# Patient Record
Sex: FEMALE | Race: WHITE | NOT HISPANIC OR LATINO | Employment: FULL TIME | URBAN - METROPOLITAN AREA
[De-identification: names, ages, dates, MRNs, and addresses within clinical notes are randomized per-mention and may not be internally consistent; named-entity substitution may affect disease eponyms.]

---

## 2018-05-22 PROBLEM — Z34.80 SUPERVISION OF OTHER NORMAL PREGNANCY, ANTEPARTUM: Status: ACTIVE | Noted: 2018-02-26

## 2018-08-17 PROBLEM — Z30.09 STERILIZATION CONSULT: Status: ACTIVE | Noted: 2018-08-17

## 2024-04-14 ENCOUNTER — OFFICE VISIT (OUTPATIENT)
Dept: URGENT CARE | Facility: PHYSICIAN GROUP | Age: 34
End: 2024-04-14
Payer: COMMERCIAL

## 2024-04-14 VITALS
DIASTOLIC BLOOD PRESSURE: 62 MMHG | HEIGHT: 67 IN | WEIGHT: 155 LBS | HEART RATE: 70 BPM | OXYGEN SATURATION: 94 % | BODY MASS INDEX: 24.33 KG/M2 | RESPIRATION RATE: 20 BRPM | SYSTOLIC BLOOD PRESSURE: 118 MMHG | TEMPERATURE: 96.9 F

## 2024-04-14 DIAGNOSIS — N30.01 ACUTE CYSTITIS WITH HEMATURIA: ICD-10-CM

## 2024-04-14 LAB
APPEARANCE UR: NORMAL
BILIRUB UR STRIP-MCNC: NEGATIVE MG/DL
COLOR UR AUTO: NORMAL
GLUCOSE UR STRIP.AUTO-MCNC: NEGATIVE MG/DL
KETONES UR STRIP.AUTO-MCNC: NEGATIVE MG/DL
LEUKOCYTE ESTERASE UR QL STRIP.AUTO: NORMAL
NITRITE UR QL STRIP.AUTO: NEGATIVE
PH UR STRIP.AUTO: 7 [PH] (ref 5–8)
POCT INT CON NEG: NEGATIVE
POCT INT CON POS: POSITIVE
POCT URINE PREGNANCY TEST: NEGATIVE
PROT UR QL STRIP: 100 MG/DL
RBC UR QL AUTO: NORMAL
SP GR UR STRIP.AUTO: 1.02
UROBILINOGEN UR STRIP-MCNC: 0.2 MG/DL

## 2024-04-14 PROCEDURE — 81002 URINALYSIS NONAUTO W/O SCOPE: CPT | Performed by: PHYSICIAN ASSISTANT

## 2024-04-14 PROCEDURE — 3078F DIAST BP <80 MM HG: CPT | Performed by: PHYSICIAN ASSISTANT

## 2024-04-14 PROCEDURE — 3074F SYST BP LT 130 MM HG: CPT | Performed by: PHYSICIAN ASSISTANT

## 2024-04-14 PROCEDURE — 81025 URINE PREGNANCY TEST: CPT | Performed by: PHYSICIAN ASSISTANT

## 2024-04-14 PROCEDURE — 99203 OFFICE O/P NEW LOW 30 MIN: CPT | Performed by: PHYSICIAN ASSISTANT

## 2024-04-14 RX ORDER — NITROFURANTOIN 25; 75 MG/1; MG/1
100 CAPSULE ORAL EVERY 12 HOURS
Qty: 10 CAPSULE | Refills: 0 | Status: SHIPPED | OUTPATIENT
Start: 2024-04-14 | End: 2024-04-19

## 2024-04-14 RX ORDER — PHENAZOPYRIDINE HYDROCHLORIDE 200 MG/1
200 TABLET, FILM COATED ORAL 3 TIMES DAILY
Qty: 6 TABLET | Refills: 0 | Status: SHIPPED | OUTPATIENT
Start: 2024-04-14 | End: 2024-04-16

## 2024-04-14 ASSESSMENT — ENCOUNTER SYMPTOMS
FEVER: 0
NAUSEA: 0
CHILLS: 0
VOMITING: 0
ABDOMINAL PAIN: 1

## 2024-04-14 NOTE — PROGRESS NOTES
Subjective     Joanna Corrales is a 34 y.o. female who presents with UTI (Frequent urination,burning,blood in urine,x1 day)      UTI  This is a new problem. The current episode started yesterday. The problem occurs constantly. The problem has been gradually worsening. Associated symptoms include abdominal pain and urinary symptoms. Pertinent negatives include no chills, fever, nausea or vomiting. Swollen glands: Painful urination, urgency/frequency, blood in the urine.She has tried NSAIDs for the symptoms. The treatment provided mild relief.       Review of Systems   Constitutional:  Negative for chills and fever.   Gastrointestinal:  Positive for abdominal pain. Negative for nausea and vomiting.           PMH:  has a past medical history of History of anemia and Ulcer.  MEDS:   Current Outpatient Medications:     nitrofurantoin (MACROBID) 100 MG Cap, Take 1 Capsule by mouth every 12 hours for 5 days., Disp: 10 Capsule, Rfl: 0    phenazopyridine (PYRIDIUM) 200 MG Tab, Take 1 Tablet by mouth 3 times a day for 2 days., Disp: 6 Tablet, Rfl: 0    traZODone (DESYREL) 50 MG Tab, Take 1 Tab by mouth at bedtime as needed for Sleep. (Patient not taking: Reported on 7/30/2020), Disp: 30 Tab, Rfl: 3  ALLERGIES: No Known Allergies  SURGHX:   Past Surgical History:   Procedure Laterality Date    PB KNEE SCOPE,DIAGNOSTIC Right 8/6/2020    Procedure: ARTHROSCOPY, KNEE;  Surgeon: Isabela Carcamo M.D.;  Location: [facility];  Service: Orthopedics    PB LATERAL RETINACULAR RELEASE OPEN Right 8/6/2020    Procedure: RELEASE, KNEE, LATERAL RETINACULA;  Surgeon: Isabela Carcamo M.D.;  Location: [facility];  Service: Orthopedics    PB OSTEOTOMY TIBIA Right 8/6/2020    Procedure: OSTEOTOMY, TIBIA - OPEN TUBEROSITY;  Surgeon: Isabela Carcamo M.D.;  Location: [facility];  Service: Orthopedics    CHONDROPLASTY Right 8/6/2020    Procedure: CHONDROPLASTY - PATELLA;  Surgeon:  "Isabela Carcamo M.D.;  Location: SURGERY [facility];  Service: Orthopedics    SUBMANDIBLE ABSCESS INCISION AND DRAINAGE Left 9/23/2018    Procedure: SUBMANDIBLE ABSCESS INCISION AND DRAINAGE;  Surgeon: Jayson Hoff D.D.S.;  Location: SURGERY [facility];  Service: Dental    DENTAL EXTRACTION(S) Left 9/23/2018    Procedure: DENTAL EXTRACTION(S);  Surgeon: Jayson Hoff D.D.S.;  Location: SURGERY [facility];  Service: Dental    PRIMARY C SECTION WITH TUBAL LIGATION Bilateral 8/24/2018    Procedure: PRIMARY C SECTION WITH TUBAL LIGATION;  Surgeon: Kari Rowland M.D.;  Location: LABOR AND DELIVERY;  Service: Labor and Delivery    CHOLECYSTECTOMY      in 04/2010    PRIMARY C SECTION       SOCHX:  reports that she has never smoked. She has never used smokeless tobacco. She reports that she does not drink alcohol and does not use drugs.  FH: Family history was reviewed, no pertinent findings to report      Objective     /62 (BP Location: Right arm, Patient Position: Sitting, BP Cuff Size: Adult)   Pulse 70   Temp 36.1 °C (96.9 °F) (Temporal)   Resp 20   Ht 1.702 m (5' 7\")   Wt 70.3 kg (155 lb) Comment: per patient  SpO2 94%   BMI 24.28 kg/m²      Physical Exam  Constitutional:       Appearance: Normal appearance. She is well-developed.   HENT:      Head: Normocephalic and atraumatic.      Right Ear: External ear normal.      Left Ear: External ear normal.   Eyes:      Conjunctiva/sclera: Conjunctivae normal.      Pupils: Pupils are equal, round, and reactive to light.   Cardiovascular:      Rate and Rhythm: Normal rate and regular rhythm.      Heart sounds: No murmur heard.  Pulmonary:      Effort: Pulmonary effort is normal.      Breath sounds: Normal breath sounds.   Abdominal:      Palpations: Abdomen is soft.      Tenderness: There is no abdominal tenderness. There is no right CVA tenderness or left CVA tenderness.   Skin:     General: Skin is warm and dry.      " Capillary Refill: Capillary refill takes less than 2 seconds.   Neurological:      Mental Status: She is alert and oriented to person, place, and time.   Psychiatric:         Behavior: Behavior normal.         Judgment: Judgment normal.         POCT Urinalysis  Lab Results   Component Value Date/Time    POCCOLOR dark yellow 04/14/2024 02:39 PM    POCAPPEAR cloudy 04/14/2024 02:39 PM    POCLEUKEST moderate 04/14/2024 02:39 PM    POCNITRITE negative 04/14/2024 02:39 PM    POCUROBILIGE 0.2 04/14/2024 02:39 PM    POCPROTEIN 100 04/14/2024 02:39 PM    POCURPH 7.0 04/14/2024 02:39 PM    POCBLOOD large 04/14/2024 02:39 PM    POCSPGRV 1.020 04/14/2024 02:39 PM    POCKETONES negative 04/14/2024 02:39 PM    POCBILIRUBIN negative 04/14/2024 02:39 PM    POCGLUCUA negative 04/14/2024 02:39 PM        POCT PREGNANCY - Negative    Assessment & Plan       1. Acute cystitis with hematuria  - POCT Urinalysis  - POCT PREGNANCY  - nitrofurantoin (MACROBID) 100 MG Cap; Take 1 Capsule by mouth every 12 hours for 5 days.  Dispense: 10 Capsule; Refill: 0  - phenazopyridine (PYRIDIUM) 200 MG Tab; Take 1 Tablet by mouth 3 times a day for 2 days.  Dispense: 6 Tablet; Refill: 0            Differential Diagnosis, natural history, and supportive care discussed. Return to the Urgent Care or follow up with your PCP if symptoms fail to resolve, or for any new or worsening symptoms. Emergency room precautions discussed. Patient and/or family appears understanding of information.

## 2024-08-19 ENCOUNTER — NON-PROVIDER VISIT (OUTPATIENT)
Dept: URGENT CARE | Facility: CLINIC | Age: 34
End: 2024-08-19

## 2024-08-19 ENCOUNTER — OCCUPATIONAL MEDICINE (OUTPATIENT)
Dept: URGENT CARE | Facility: CLINIC | Age: 34
End: 2024-08-19
Payer: COMMERCIAL

## 2024-08-19 VITALS
WEIGHT: 155 LBS | HEIGHT: 67 IN | SYSTOLIC BLOOD PRESSURE: 130 MMHG | OXYGEN SATURATION: 98 % | TEMPERATURE: 97.4 F | RESPIRATION RATE: 16 BRPM | BODY MASS INDEX: 24.33 KG/M2 | HEART RATE: 64 BPM | DIASTOLIC BLOOD PRESSURE: 74 MMHG

## 2024-08-19 DIAGNOSIS — Z02.1 PRE-EMPLOYMENT DRUG SCREENING: ICD-10-CM

## 2024-08-19 DIAGNOSIS — S51.851A CAT BITE OF RIGHT FOREARM, INITIAL ENCOUNTER: ICD-10-CM

## 2024-08-19 DIAGNOSIS — Z02.83 ENCOUNTER FOR DRUG SCREENING: Primary | ICD-10-CM
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
## 2024-08-19 LAB
AMP AMPHETAMINE: NORMAL
BREATH ALCOHOL COMMENT: NORMAL
COC COCAINE: NORMAL
INT CON NEG: NORMAL
INT CON POS: NORMAL
MET METHAMPHETAMINES: NORMAL
OPI OPIATES: NORMAL
PCP PHENCYCLIDINE: NORMAL
POC BREATHALIZER: 0 PERCENT (ref 0–0.01)
POC DRUG COMMENT 753798-OCCUPATIONAL HEALTH: NEGATIVE
THC: NORMAL

## 2024-08-19 PROCEDURE — 99213 OFFICE O/P EST LOW 20 MIN: CPT | Performed by: NURSE PRACTITIONER

## 2024-08-19 PROCEDURE — 80305 DRUG TEST PRSMV DIR OPT OBS: CPT | Performed by: NURSE PRACTITIONER

## 2024-08-19 PROCEDURE — 3078F DIAST BP <80 MM HG: CPT | Performed by: NURSE PRACTITIONER

## 2024-08-19 PROCEDURE — 82075 ASSAY OF BREATH ETHANOL: CPT | Performed by: NURSE PRACTITIONER

## 2024-08-19 PROCEDURE — 3075F SYST BP GE 130 - 139MM HG: CPT | Performed by: NURSE PRACTITIONER

## 2024-08-19 RX ORDER — IBUPROFEN 800 MG/1
800 TABLET, FILM COATED ORAL EVERY 8 HOURS PRN
Qty: 90 TABLET | Refills: 0 | Status: SHIPPED | OUTPATIENT
Start: 2024-08-19

## 2024-08-19 ASSESSMENT — ENCOUNTER SYMPTOMS
SENSORY CHANGE: 0
CHILLS: 0
FEVER: 0
DIZZINESS: 0
TINGLING: 0
FOCAL WEAKNESS: 0
MYALGIAS: 0

## 2024-08-19 NOTE — LETTER
"    EMPLOYEE’S CLAIM FOR COMPENSATION/ REPORT OF INITIAL TREATMENT  FORM C-4  PLEASE TYPE OR PRINT    EMPLOYEE’S CLAIM - PROVIDE ALL INFORMATION REQUESTED   First Name                    LURDES BELLE                  Last Name  PULSIFER Birthdate                    1990                Sex  Female Claim Number (Insurer’s Use Only)     Home Address  [address] Age  34 y.o. Height  1.702 m (5' 7\") Weight  70.3 kg (155 lb) Social Security Number     [facility] Zip  [address] Telephone  [phone] (home)    Mailing Address  [address] [facility] Zip  [address] Primary Language Spoken  English    INSURER     THIRD-PARTY   [employer]   Employee's Occupation (Job Title) When Injury or Occupational Disease Occurred      Employer's Name/Company Name  [employer]  Telephone  [phone]    Office Mail Address (Number and Street)  [address] [place] [address]     Date of Injury (if applicable) 8/19/2024               Hours Injury (if applicable)  8:25 AM Date Employer Notified  8/19/2024 Last Day of Work after Injury or Occupational Disease  8/19/2024 Supervisor to Whom Injury Reported  Kasia Morales   Address or Location of Accident (if applicable)  Work [1]   What were you doing at the time of accident? (if applicable)  Getting weight on a cat    How did this injury or occupational disease occur? (Be specific and answer in detail. Use additional sheet if necessary)  Vet asked for a current weight on a cat in the exam room.  I was removing cat from the den and he freaked out. He twisted and got my hand and arm with his teeth and claws.   If you believe that you have an occupational disease, when did you first have knowledge of the disability and its relationship to your employment?  N/a Witnesses to the Accident (if applicable)  Non at exact moment.   " Amisha came after hearing the cat.etc      Nature of Injury or Occupational Disease  Laceration  Part(s) of Body Injured or Affected  Wrist (R) and Hand (R) N/A N/A    I CERTIFY THAT THE ABOVE IS TRUE AND CORRECT TO T HE BEST OF MY KNOWLEDGE AND THAT I HAVE PROVIDED THIS INFORMATION IN ORDER TO OBTAIN THE BENEFITS OF [place], OR [place]).  I HEREBY AUTHORIZE ANY PHYSICIAN, CHIROPRACTOR, SURGEON, PRACTITIONER OR ANY OTHER PERSON, ANY HOSPITAL, INCLUDING [facility] OR [facility], ANY  MEDICAL SERVICE ORGANIZATION, ANY INSURANCE COMPANY, OR OTHER INSTITUTION OR ORGANIZATION TO RELEASE TO EACH OTHER, ANY MEDICAL OR OTHER INFORMATION, INCLUDING BENEFITS PAID OR PAYABLE, PERTINENT TO THIS INJURY OR DISEASE, EXCEPT INFORMATION RELATIVE TO DIAGNOSIS, TREATMENT AND/OR COUNSELING FOR AIDS, PSYCHOLOGICAL CONDITIONS, ALCOHOL OR CONTROLLED SUBSTANCES, FOR WHICH I MUST GIVE SPECIFIC AUTHORIZATION.  A PHOTOSTAT OF THIS AUTHORIZATION SHALL BE VALID AS THE ORIGINAL.     Date 8/19/24   [facility] Employee’s Original or  *Electronic Signature   THIS REPORT MUST BE COMPLETED AND MAILED WITHIN 3 WORKING DAYS OF TREATMENT   [facility]    Name of Facility  [facility]   Date 8/19/2024 Diagnosis and Description of Injury or Occupational Disease  (S51.851A,  W55.01XA) Cat bite of right forearm, initial encounter  The encounter diagnosis was Cat bite of right forearm, initial encounter. Is there evidence that the injured employee was under the influence of alcohol and/or another controlled substance at the time of accident?  []No  [] Yes (if yes, please explain)   Hour 9:58 AM  No   Treatment: Ceftriaxone IM in clinic. Augmentin and ibuprofen.    Have you advised the patient to remain off work five days or more?   [] Yes Indicate dates: From   To    [] No      If no, is the injured employee capable of: []  full duty [] modified duty                     If modified duty, specify any limitations / restrictions:                                                                                                                                                                                                                                                                                                                                                                                                                  X-Ray Findings:  N/A    From information given by the employee, together with medical evidence, can you directly connect this injury or occupational disease as job incurred?  []Yes   [] No Yes    Is additional medical care by a physician indicated? []Yes [] No  Yes    Do you know of any previous injury or disease contributing to this condition or occupational disease? []Yes [] No (Explain if yes)                          No   Date  8/19/2024 Print Health Care Provider’s Name  Nathalia Varela A.P.N. I certify that the employer’s copy of  this form was delivered to the employer on:   Address  [address] [address] INSURER'S USE ONLY                       [facility] Zip  [address] Provider’s Tax ID Number  [id number]   Telephone  Dept: [phone]    Health Care Provider’s Original or Electronic Signature  e-NATHALIA Sheridan A.P.EMMANUEL Degree (MD,DO, DC,PA-C,APRN)  APRN  Choose (if applicable)      ORIGINAL - TREATING HEALTHCARE PROVIDER PAGE 2 - INSURER/TPA PAGE 3 - EMPLOYER PAGE 4 - EMPLOYEE             Form C-4 (rev.08/23)

## 2024-08-19 NOTE — PROGRESS NOTES
Joanna Omerelle NEETU is a 34 y.o. female here for a non-provider visit for post accident drug screen    If abnormal was an in office provider notified today (if so, indicate provider)? No    Routed to PCP? No

## 2024-08-19 NOTE — PROGRESS NOTES
Subjective     Joanna DE ANDA is a 34 y.o. female who presents with Cat Bite (WC NEW, cat bite and scratches to R hand/ wrist. DOI 8/19/24)            HPIDOI: 8/19/24/ Patient is 34 year old female with cat bite and scratches to right hand. She was attacked as she was trying to weigh cat. Painful along thumb back into wrist. No distal paresthesia. UTD on tetanus and right hand dominant.     Patient has no known allergies.  Current Outpatient Medications on File Prior to Visit   Medication Sig Dispense Refill    traZODone (DESYREL) 50 MG Tab Take 1 Tab by mouth at bedtime as needed for Sleep. (Patient not taking: Reported on 7/30/2020) 30 Tab 3     No current facility-administered medications on file prior to visit.     Social History     Socioeconomic History    Marital status:      Spouse name: Not on file    Number of children: Not on file    Years of education: Not on file    Highest education level: Not on file   Occupational History    Not on file   Tobacco Use    Smoking status: Never    Smokeless tobacco: Never   Vaping Use    Vaping status: Never Used   Substance and Sexual Activity    Alcohol use: No    Drug use: No    Sexual activity: Yes     Partners: Male     Birth control/protection: Surgical     Comment: Plans BTL.   Other Topics Concern    Not on file   Social History Narrative    Not on file     Social Determinants of Health     Financial Resource Strain: Not on file   Food Insecurity: Not on file   Transportation Needs: Not on file   Physical Activity: Not on file   Stress: Not on file   Social Connections: Not on file   Intimate Partner Violence: Not on file   Housing Stability: Not on file     Breast Cancer-related family history is not on file.      Review of Systems   Constitutional:  Negative for chills, fever and malaise/fatigue.   Musculoskeletal:  Negative for joint pain and myalgias.   Skin:         Cat bite to right forearm. Scratches to wrist and hand.   Neurological:   "Negative for dizziness, tingling, sensory change and focal weakness.              Objective     /74   Pulse 64   Temp 36.3 °C (97.4 °F)   Resp 16   Ht 1.702 m (5' 7\")   Wt 70.3 kg (155 lb)   SpO2 98%   BMI 24.28 kg/m²      Physical Exam  Vitals and nursing note reviewed.   Constitutional:       Appearance: Normal appearance.   Cardiovascular:      Rate and Rhythm: Normal rate and regular rhythm.      Heart sounds: No murmur heard.  Pulmonary:      Effort: Pulmonary effort is normal.      Breath sounds: Normal breath sounds.   Musculoskeletal:         General: Normal range of motion.   Skin:     General: Skin is warm and dry.      Comments: Patient with puncture wounds x 2 to right wrist/forearm.  She does have scratches along her wrist and into her hand.  She is clean the wounds at work with chlorhexidine.   Neurological:      General: No focal deficit present.      Mental Status: She is alert and oriented to person, place, and time.                             Assessment & Plan        Assessment & Plan  Cat bite of right forearm, initial encounter    Orders:    cefTRIAXone (Rocephin) 1 g in lidocaine (Xylocaine) 1 % 4 mL for IM use    amoxicillin-clavulanate (AUGMENTIN) 875-125 MG Tab; Take 1 Tablet by mouth 2 times a day for 7 days.    ibuprofen (MOTRIN) 800 MG Tab; Take 1 Tablet by mouth every 8 hours as needed for Moderate Pain.    Rocephin in clinic.  Augmentin and ibuprofen.  Wound care discussed with patient. Wounds dressed in clinic.  Follow up Friday. Full duty              "

## 2024-08-19 NOTE — LETTER
PHYSICIAN’S AND CHIROPRACTIC PHYSICIAN'S   PROGRESS REPORT CERTIFICATION OF DISABILITY Claim Number:     Social Security Number:    Patient’s Name:YOSHI DE ANDA Date of Injury:8/19/2024   Employer: [employer] Name of O (if applicable)      Patient’s Job Description/Occupation:        Previous Injuries/Diseases/Surgeries Contributing to the Condition:  none      Diagnosis:  (S51.851A,  W55.01XA) Cat bite of right forearm, initial encounter      Related to the Industrial Injury? Yes     Explain:DOI: 8/19/24/ Patient is 34 year old female with cat bite and scratches to right hand. She was attacked as she was trying to weigh cat. Painful along thumb back into wrist. No distal paresthesia. UTD on tetanus and right hand dominant.       Objective Medical Findings:Physical Exam  Vitals and nursing note reviewed.   Constitutional:       Appearance: Normal appearance.   Cardiovascular:      Rate and Rhythm: Normal rate and regular rhythm.      Heart sounds: No murmur heard.  Pulmonary:      Effort: Pulmonary effort is normal.      Breath sounds: Normal breath sounds.   Musculoskeletal:         General: Normal range of motion.   Skin:     General: Skin is warm and dry.      Comments: Patient with puncture wounds x 2 to right wrist/forearm.  She does have scratches along her wrist and into her hand.  She is clean the wounds at work with chlorhexidine.   Neurological:      General: No focal deficit present.      Mental Status: She is alert and oriented to person, place, and time.             None - Discharged                         Stable  Yes                 Ratable  No       Generally Improved                        Condition Worsened                 Condition Same  May Have Suffered a Permanent Disability  No     Treatment Plan:           X  No Change in Therapy                 PT/OT Prescribed                   X  Medication May be Used While Working       Case Management                         PT/OT Discontinued    Consultation    Further Diagnostic Studies:                               Prescription(s)          Yes, augmentin and ibuprofen, rocephin in clinic                X  Released to FULL DUTY /No Restrictions on (Date):  From:      Certified TOTALLY TEMPORARILY DISABLED (Indicate Dates) From:   To:      Released to RESTRICTED/Modified Duty on (Date): From:   To:                                                                 Restrictions Are:  Temporary     No Sitting                               No Standing                   No Pulling                  Other:      No Bending at Waist           No Stooping                    No Lifting       No Carrying                           No Walking                Lifting Restricted to (lbs.):       No Pushing                            No Climbing                   No Reaching Above Shoulders   Date of Next Visit:  8/23/2024 Date of this Exam:8/19/2024 Physician/Chiropractic Physician Name:LORAINE Martines.PLeighN. Physician/Chiropractic Physician Signature:  Fer Gonzalez DO MPH   D-39 (Rev. 2/24)

## 2024-08-23 ENCOUNTER — OCCUPATIONAL MEDICINE (OUTPATIENT)
Dept: URGENT CARE | Facility: CLINIC | Age: 34
End: 2024-08-23
Payer: COMMERCIAL

## 2024-08-23 VITALS
WEIGHT: 155 LBS | SYSTOLIC BLOOD PRESSURE: 122 MMHG | HEIGHT: 67 IN | HEART RATE: 69 BPM | DIASTOLIC BLOOD PRESSURE: 74 MMHG | OXYGEN SATURATION: 98 % | BODY MASS INDEX: 24.33 KG/M2 | RESPIRATION RATE: 16 BRPM | TEMPERATURE: 97.9 F
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
## 2024-08-23 DIAGNOSIS — S51.851D CAT BITE OF RIGHT FOREARM, SUBSEQUENT ENCOUNTER: ICD-10-CM

## 2024-08-23 PROCEDURE — 99213 OFFICE O/P EST LOW 20 MIN: CPT | Performed by: PHYSICIAN ASSISTANT

## 2024-08-23 PROCEDURE — 3074F SYST BP LT 130 MM HG: CPT | Performed by: PHYSICIAN ASSISTANT

## 2024-08-23 PROCEDURE — 3078F DIAST BP <80 MM HG: CPT | Performed by: PHYSICIAN ASSISTANT

## 2024-08-23 ASSESSMENT — ENCOUNTER SYMPTOMS
SENSORY CHANGE: 0
TINGLING: 0
FEVER: 0
CHILLS: 0

## 2024-08-23 NOTE — PROGRESS NOTES
"Subjective     Joanna DE ANDA is a 34 y.o. female who presents with Injury (WC F/U: Injury is feeling better; feels bruised. )      DOI: 8/19/2024 8/19/2024, Visit # 1: \"Patient is 34 year old female with cat bite and scratches to right hand. She was attacked as she was trying to weigh cat. Painful along thumb back into wrist. No distal paresthesia. UTD on tetanus and right hand dominant.\"    8/23/2024, Visit # 2: Patient was treated with Rocephin IM in the urgent care on 8/19.  Was not able to start the oral antibiotics until yesterday.  Still feel some soreness and has noted some mild bruising but says it feels good otherwise.  She has not noticed any fever, distal numbness/tingling, purulent discharge, or significant redness.  She has been tolerating full duty.        Review of Systems   Constitutional:  Negative for chills and fever.   Musculoskeletal:  Negative for joint pain.   Skin:         Cat bite/scratches of forearm/hand   Neurological:  Negative for tingling and sensory change.           PMH: No pertinent past medical history to this problem  MEDS: Medications were reviewed in Epic  ALLERGIES: Allergies were reviewed in Epic  SOCHX: Works as a  at [employer]   FH: No pertinent family history to this problem        Objective     /74   Pulse 69   Temp 36.6 °C (97.9 °F)   Resp 16   Ht 1.702 m (5' 7\")   Wt 70.3 kg (155 lb)   SpO2 98%   BMI 24.28 kg/m²      Physical Exam  Constitutional:       General: She is not in acute distress.     Appearance: She is not diaphoretic.   HENT:      Head: Normocephalic and atraumatic.      Right Ear: External ear normal.      Left Ear: External ear normal.   Eyes:      Conjunctiva/sclera: Conjunctivae normal.      Pupils: Pupils are equal, round, and reactive to light.   Pulmonary:      Effort: Pulmonary effort is normal. No respiratory distress.   Musculoskeletal:      Comments: Patient with healing puncture wounds to right " wrist/forearm.  There is overlying scab.  Very faint yellowish ecchymosis surrounding the area and very mild tenderness directly over the wounds.  There are also a few scabbed over superficial scratches to the dorsum of the right hand.  No purulent discharge or surrounding erythema or warmth.  She has full ROM of right upper extremity.     Skin:     Findings: No rash.   Neurological:      Mental Status: She is alert and oriented to person, place, and time.   Psychiatric:         Mood and Affect: Mood and affect normal.         Cognition and Memory: Memory normal.         Judgment: Judgment normal.         Assessment & Plan     1. Cat bite of right forearm, subsequent encounter  4 days after the initial incident there are no signs of infection.  She started the oral antibiotics yesterday.  Has been tolerating full duty at work.  She will be discharged/MMI with instructions to complete the full course of antibiotics and continue to keep the wound clean.

## 2024-08-23 NOTE — LETTER
PHYSICIAN’S AND CHIROPRACTIC PHYSICIAN'S   PROGRESS REPORT CERTIFICATION OF DISABILITY Claim Number:     Social Security Number:    Patient’s Name: YOSHI DE ANDA Date of Injury: 8/19/2024   Employer: [employer] Name of MCO (if applicable):      Patient’s Job Description/Occupation: @[id number](CLM,312,1,,,,,2) )@       Previous Injuries/Diseases/Surgeries Contributing to the Condition:  no      Diagnosis: (S51.851D,  W55.01XD) Cat bite of right forearm, subsequent encounter      Related to the Industrial Injury? Yes     Explain:        Objective Medical Findings: Musculoskeletal:      Comments: Patient with healing puncture wounds to right wrist/forearm.  There is overlying scab.  Very faint yellowish ecchymosis surrounding the area and very mild tenderness directly over the wounds.  There are also a few scabbed over superficial scratches to the dorsum of the right hand.  No purulent discharge or surrounding erythema or warmth.  She has full ROM of right upper extremity.        X   None - Discharged                         Stable  No                 Ratable  No     X   Generally Improved                         Condition Worsened                  Condition Same  May Have Suffered a Permanent Disability No     Treatment Plan:    4 days after the initial incident there are no signs of infection.  She started the oral antibiotics yesterday.  Has been tolerating full duty at work.  She will be discharged/MMI with instructions to complete the full course of antibiotics and continue to keep the wound clean.         No Change in Therapy                  PT/OT Prescribed                      Medication May be Used While Working        Case Management                          PT/OT Discontinued    Consultation    Further Diagnostic Studies:    Prescription(s)           Complete course of Augmentin   X  Released to FULL DUTY /No Restrictions on (Date):  From:      Certified TOTALLY TEMPORARILY DISABLED  (Indicate Dates) From:   To:      Released to RESTRICTED/Modified Duty on (Date): From:   To:    Restrictions Are:         No Sitting    No Standing    No Pulling Other:         No Bending at Waist     No Stooping     No Lifting        No Carrying     No Walking Lifting Restricted to (lbs.):          No Pushing        No Climbing     No Reaching Above Shoulders       Date of Next Visit:    Date of this Exam: 8/23/2024 Physician/Chiropractic Physician Name: Angie Marshall P.A.-C. Physician/Chiropractic Physician Signature:  Fer Gonzalez DO MPH                                                                                                                                                                                                            D-39 (Rev. 2/24)